# Patient Record
(demographics unavailable — no encounter records)

---

## 2025-02-14 NOTE — END OF VISIT
[FreeTextEntry3] : Written by Thao Mullins, acting as a scribe for Dr. Leanne Karimi This note accurately reflects the work and decisions made by me.

## 2025-02-14 NOTE — ASSESSMENT
[FreeTextEntry1] : Patient is doing well from a gynecological standpoint. We discussed her Ultrasound results which was overall reassuring. Patient reports her mentrual cycles are wnl. We will consider prophylactic removal of ovaries closer to menopause. She will return in 1 year for annual exam and Ultrasound review.

## 2025-02-14 NOTE — PHYSICAL EXAM
[Chaperone Present] : A chaperone was present in the examining room during all aspects of the physical examination [06681] : A chaperone was present during the pelvic exam. [FreeTextEntry2] : Thao Mullins Medical assistant chaperoned during gynecologic exam.  [Abnormal] : Uterus: Abnormal [Normal] : Recto-Vaginal Exam: Normal [de-identified] : Anteverted uterus

## 2025-02-14 NOTE — REASON FOR VISIT
[FreeTextEntry1] : [facility] [phone] at [address] [place] [id number]   Family hx of breast/ovarian cancer s/p B/L salpingectomy - 3/7/2023  Annual exam / review US

## 2025-02-14 NOTE — REASON FOR VISIT
[FreeTextEntry1] : [facility] [phone] at [address] [place] [id number]   Family hx of breast/ovarian cancer s/p B/L salpingectomy - 3/7/2023  Annual exam / review US

## 2025-02-14 NOTE — HISTORY OF PRESENT ILLNESS
[FreeTextEntry1] : 46 y/o patient with family history of ovarian cancer. She is s/p prophylactic B/L salpingectomy on 3/7/23. Has pelvic US with Dr. Aponte for planned f/u with me until she reaches menopause. Returns for an exam and to review result.  Follows with her GYN/Dr. Sierra for well woman exam. She is going to chiropractor for back pain.  TV US (1/22/24): 8.3 x 5.3 x 4.9 cm anteverted uterus No focal endometrial lesion No abnormal adnexal mass or fluid collection is identified  TV US 1/28/25:  5.1 x 5.0 x 3.9 cm anteverted uterus Small blood filled isthmocele (image)  no abnormal adnexa mass or fluid collection is identified.

## 2025-02-14 NOTE — HISTORY OF PRESENT ILLNESS
[FreeTextEntry1] : 48 y/o patient with family history of ovarian cancer. She is s/p prophylactic B/L salpingectomy on 3/7/23. Has pelvic US with Dr. Aponte for planned f/u with me until she reaches menopause. Returns for an exam and to review result.  Follows with her GYN/Dr. Sierra for well woman exam. She is going to chiropractor for back pain.  TV US (1/22/24): 8.3 x 5.3 x 4.9 cm anteverted uterus No focal endometrial lesion No abnormal adnexal mass or fluid collection is identified  TV US 1/28/25:  5.1 x 5.0 x 3.9 cm anteverted uterus Small blood filled isthmocele (image)  no abnormal adnexa mass or fluid collection is identified.

## 2025-02-14 NOTE — PHYSICAL EXAM
[Chaperone Present] : A chaperone was present in the examining room during all aspects of the physical examination [94100] : A chaperone was present during the pelvic exam. [FreeTextEntry2] : Thao Mullins Medical assistant chaperoned during gynecologic exam.  [Abnormal] : Uterus: Abnormal [Normal] : Recto-Vaginal Exam: Normal [de-identified] : Anteverted uterus